# Patient Record
Sex: MALE | Race: WHITE
[De-identification: names, ages, dates, MRNs, and addresses within clinical notes are randomized per-mention and may not be internally consistent; named-entity substitution may affect disease eponyms.]

---

## 2021-07-12 ENCOUNTER — HOSPITAL ENCOUNTER (INPATIENT)
Dept: HOSPITAL 11 - JP.ED | Age: 76
LOS: 1 days | Discharge: LEFT BEFORE DISCHARGE | DRG: 923 | End: 2021-07-13
Attending: INTERNAL MEDICINE | Admitting: FAMILY MEDICINE
Payer: OTHER GOVERNMENT

## 2021-07-12 DIAGNOSIS — F17.200: ICD-10-CM

## 2021-07-12 DIAGNOSIS — F32.9: ICD-10-CM

## 2021-07-12 DIAGNOSIS — I49.9: ICD-10-CM

## 2021-07-12 DIAGNOSIS — Z88.8: ICD-10-CM

## 2021-07-12 DIAGNOSIS — R45.1: ICD-10-CM

## 2021-07-12 DIAGNOSIS — G93.40: ICD-10-CM

## 2021-07-12 DIAGNOSIS — Z90.49: ICD-10-CM

## 2021-07-12 DIAGNOSIS — Z96.649: ICD-10-CM

## 2021-07-12 DIAGNOSIS — Z88.0: ICD-10-CM

## 2021-07-12 DIAGNOSIS — T67.5XXA: ICD-10-CM

## 2021-07-12 DIAGNOSIS — F41.9: ICD-10-CM

## 2021-07-12 DIAGNOSIS — T67.01XA: Primary | ICD-10-CM

## 2021-07-12 DIAGNOSIS — Z79.899: ICD-10-CM

## 2021-07-12 DIAGNOSIS — Z91.041: ICD-10-CM

## 2021-07-12 DIAGNOSIS — M62.82: ICD-10-CM

## 2021-07-12 DIAGNOSIS — F43.10: ICD-10-CM

## 2021-07-12 DIAGNOSIS — E86.0: ICD-10-CM

## 2021-07-12 DIAGNOSIS — Z98.49: ICD-10-CM

## 2021-07-12 PROCEDURE — 85379 FIBRIN DEGRADATION QUANT: CPT

## 2021-07-12 PROCEDURE — 72125 CT NECK SPINE W/O DYE: CPT

## 2021-07-12 PROCEDURE — 80053 COMPREHEN METABOLIC PANEL: CPT

## 2021-07-12 PROCEDURE — 83690 ASSAY OF LIPASE: CPT

## 2021-07-12 PROCEDURE — 70450 CT HEAD/BRAIN W/O DYE: CPT

## 2021-07-12 PROCEDURE — 80305 DRUG TEST PRSMV DIR OPT OBS: CPT

## 2021-07-12 PROCEDURE — 87040 BLOOD CULTURE FOR BACTERIA: CPT

## 2021-07-12 PROCEDURE — 84484 ASSAY OF TROPONIN QUANT: CPT

## 2021-07-12 PROCEDURE — 93005 ELECTROCARDIOGRAM TRACING: CPT

## 2021-07-12 PROCEDURE — 82140 ASSAY OF AMMONIA: CPT

## 2021-07-12 PROCEDURE — 82550 ASSAY OF CK (CPK): CPT

## 2021-07-12 PROCEDURE — 85025 COMPLETE CBC W/AUTO DIFF WBC: CPT

## 2021-07-12 PROCEDURE — 36415 COLL VENOUS BLD VENIPUNCTURE: CPT

## 2021-07-12 PROCEDURE — 85610 PROTHROMBIN TIME: CPT

## 2021-07-12 PROCEDURE — 83605 ASSAY OF LACTIC ACID: CPT

## 2021-07-12 PROCEDURE — 81001 URINALYSIS AUTO W/SCOPE: CPT

## 2021-07-12 NOTE — EDM.PDOC
ED HPI GENERAL MEDICAL PROBLEM





- General


Chief Complaint: General


Stated Complaint: SUFFERING FROM PTSD


Time Seen by Provider: 07/12/21 17:47


Source of Information: Reports: Patient, Family (wife)





- History of Present Illness


INITIAL COMMENTS - FREE TEXT/NARRATIVE: 


Attempts to room Asad were delayed due to deep sleep while seated in a chair in 

the lobby.  Nursing staff assessed vitals signs and allowed Asad to sleep.  Wife

request patient be evaluated at time of waking in the lobby.  She was not 

comfortable taking him home or back to the [facility] without additional 

assessment.  Wife reported he has not been able to sleep for the last 2-3 night 

at the [facility] area. 





1800 Patient roomed and assessment is attempted but patient is sound asleep 

unable to awaken. Wife reports leaving [place] 1-2 days ago, Asad drove both

of them to the [facility]. Asad has been acting funny and tired while 

trying to drink adequate fluids but increase confusion and concerns about 

dehydration per wife. 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 

[employer].  Asad normally seeks care at [facility] in [place], [place]. Asad 

seemed fine yesterday after arriving in the [place] area become anxious and unable to

sleep. Report of previous skull fracture with metal plate right side of his 

head. 





Nursing staff attempted to get blood pressure which awakened patient whom seems 

to have involuntary movement of his right arm and confusion with recent memory 

loss. Asad does not recall coming to [place] ER with concerns earlier today 

or sleeping in the lobby for the last 4+hrs. Asad verbal consents to work-up 

during ER visit today but flailing arms and movements make assessment 

difficulty. Offered IM vs oral medications for anxiety and PTSD symptoms. Asad 

denies medications for PTSD or anxiety. 














- Related Data


                                    Allergies











Allergy/AdvReac Type Severity Reaction Status Date / Time


 


bupropion [From Wellbutrin] Allergy  Cannot Verified 07/12/21 18:16





   Remember  


 


Iodinated Contrast Media Allergy  Anaphylactic Verified 07/12/21 18:16





   Shock  


 


Penicillins Allergy  Cannot Verified 07/12/21 18:16





   Remember  


 


quetiapine [From Seroquel] Allergy  Other Verified 07/12/21 19:12


 


trazodone Allergy  Seizure Verified 07/12/21 19:12











Home Meds: 


                                    Home Meds





Baclofen 10 mg PO BID 07/12/21 [History]


Methylphenidate [Ritalin] 10 mg PO TID 07/12/21 [History]


traMADol [Ultram] 50 mg PO ASDIRECTED 07/12/21 [History]











ED ROS GENERAL





- Review of Systems


Review Of Systems: Unable To Obtain


Reason Not Obtained: confusion with unknown baseline. Seems to knwon wife. 





ED EXAM, GENERAL





- Physical Exam


Exam: See Below


Exam Limited By: Altered Mental Status (confusion with memory loss)


General Appearance: Alert, Moderate Distress (large non focal movements with 

right arm. seems anxious and difficult to redirect. ), Thin


Eye Exam: Bilateral Eye: EOMI, Normal Inspection





Course





- Vital Signs


Last Recorded V/S: 


                                Last Vital Signs











Temp      


 


Pulse  56 L  07/12/21 21:20


 


Resp  19   07/12/21 21:20


 


BP  118/55 L  07/12/21 21:20


 


Pulse Ox  85 L  07/12/21 21:20














- Orders/Labs/Meds


Orders: 


                               Active Orders 24 hr











 Category Date Time Status


 


 Patient Status [ADT] Routine ADT  07/12/21 22:13 Active


 


 Cardiac Monitoring [RC] .As Directed Care  07/12/21 20:09 Active


 


 Cardiac Monitoring [RC] CONTINUOUS Care  07/12/21 22:24 Active


 


 EKG Documentation Completion [RC] ASDIRECTED Care  07/12/21 22:30 Active


 


 Notify Provider Vital Signs [RC] ASDIRECTED Care  07/12/21 22:24 Active


 


 Oxygen Therapy [RC] PRN Care  07/12/21 22:13 Active


 


 Peripheral IV Care [RC] .AS DIRECTED Care  07/12/21 18:14 Active


 


 Pulse Oximetry [RC] CONTINUOUS Care  07/12/21 22:24 Active


 


 RT Aerosol Therapy [RC] ASDIRECTED Care  07/12/21 22:28 Active


 


 Up With Assistance [RC] ASDIRECTED Care  07/12/21 22:20 Active


 


 VTE/DVT Education [RC] Per Unit Routine Care  07/12/21 22:13 Active


 


 Vital Signs [RC] Q4H Care  07/12/21 22:13 Active


 


 Heart Healthy Diet [DIET] Diet  07/12/21 Breakfast Active


 


 CBC WITH AUTO DIFF [HEME] AM Lab  07/13/21 05:11 Ordered


 


 COMPREHENSIVE METABOLIC PN,CMP [CHEM] AM Lab  07/13/21 05:11 Ordered


 


 CREATINE KINASE,CK [CHEM] AM Lab  07/13/21 05:11 Ordered


 


 CULTURE BLOOD [BC] Urgent Lab  07/12/21 19:30 Received


 


 CULTURE BLOOD [BC] Urgent Lab  07/12/21 19:42 Received


 


 Acetaminophen [TylenoL] Med  07/12/21 22:25 Active





 650 mg PO Q4H PRN   


 


 Albuterol/Ipratropium [DuoNeb 3.0-0.5 MG/3 ML] Med  07/12/21 22:25 Ordered





 3 ml NEB QID PRN   


 


 Morphine Med  07/12/21 22:25 Ordered





 2 mg IVPUSH Q2H PRN   


 


 Ondansetron [Zofran ODT] Med  07/12/21 22:25 Ordered





 4 mg PO Q6H PRN   


 


 Promethazine [Phenergan] 12.5 mg Med  07/12/21 22:25 Ordered





 Sodium Chloride 0.9% [Normal Saline] 50 ml   





 IV Q6H   


 


 Sodium Chloride 0.9% [Normal Saline] 1,000 ml Med  07/12/21 20:00 Active





 IV ASDIRECTED   


 


 Sodium Chloride 0.9% [Normal Saline] 1,000 ml Med  07/12/21 22:30 Ordered





 IV ASDIRECTED   


 


 Sodium Chloride 0.9% [Saline Flush] Med  07/12/21 18:14 Active





 10 ml FLUSH ASDIRECTED PRN   


 


 oxyCODONE Med  07/12/21 22:25 Ordered





 5 mg PO Q4H PRN   


 


 Blood Culture x2 Reflex Set [OM.PC] Urgent Oth  07/12/21 18:24 Ordered


 


 Peripheral IV Insertion Adult [OM.PC] Urgent Oth  07/12/21 18:14 Ordered


 


 Resuscitation Status Routine Resus Stat  07/12/21 22:13 Ordered


 


 EKG 12 Lead [EK] Routine Ther  07/12/21 22:29 Ordered








                                Medication Orders





Acetaminophen (Acetaminophen 325 Mg Tab)  650 mg PO Q4H PRN


   PRN Reason: Pain (Mild 1-3)/fever


Albuterol/Ipratropium (Albuterol/Ipratropium 3.0-0.5 Mg/3 Ml Neb Soln)  3 ml NEB

 QID PRN


   PRN Reason: Shortness Of Breath/wheezing


Sodium Chloride (Normal Saline)  1,000 mls @ 500 mls/hr IV ASDIRECTED ENA


   Last Admin: 07/12/21 21:17  Dose: 500 mls/hr


   Documented by: REBECCAMEL


Sodium Chloride (Normal Saline)  1,000 mls @ 250 mls/hr IV ASDIRECTED ENA


Promethazine HCl 12.5 mg/ (Sodium Chloride)  50.5 mls @ 200 mls/hr IV Q6H PRN


   PRN Reason: Nausea/Vomiting


Morphine Sulfate (Morphine 2 Mg/Ml Syringe)  2 mg IVPUSH Q2H PRN


   PRN Reason: Pain (severe 7-10)


Ondansetron HCl (Ondansetron 4 Mg Tab.Dis)  4 mg PO Q6H PRN


   PRN Reason: Nausea able to take PO


Oxycodone HCl (Oxycodone 5 Mg Tab)  5 mg PO Q4H PRN


   PRN Reason: Pain (moderate 4-6)


Sodium Chloride (Sodium Chloride 0.9% 10 Ml Syringe)  10 ml FLUSH ASDIRECTED PRN


   PRN Reason: Keep Vein Open


   Last Admin: 07/12/21 19:41  Dose: 10 ml


   Documented by: NINA








Labs: 


                                Laboratory Tests











  07/12/21 07/12/21 07/12/21 Range/Units





  19:21 19:21 19:30 


 


WBC    9.3  (4.5-11.0)  K/uL


 


RBC    4.54  (4.30-5.90)  M/uL


 


Hgb    13.2  (12.0-15.0)  g/dL


 


Hct    38.8 L  (40.0-54.0)  %


 


MCV    86  (80-98)  fL


 


MCH    29  (27-31)  pg


 


MCHC    34  (32-36)  %


 


Plt Count    184  (150-400)  K/uL


 


Neut % (Auto)    71.3 H  (36-66)  %


 


Lymph % (Auto)    18.4 L  (24-44)  %


 


Mono % (Auto)    9.7 H  (2-6)  %


 


Eos % (Auto)    0.5 L  (2-4)  %


 


Baso % (Auto)    0.1  (0-1)  %


 


PT     (9.5-12.0)  sec


 


INR     (0.80-1.20)  


 


D-Dimer, Quantitative     (0.0-500.0)  ng/mL


 


Sodium     (140-148)  mmol/L


 


Potassium     (3.6-5.2)  mmol/L


 


Chloride     (100-108)  mmol/L


 


Carbon Dioxide     (21-32)  mmol/L


 


Anion Gap     (5.0-14.0)  mmol/L


 


BUN     (7-18)  mg/dL


 


Creatinine     (0.8-1.3)  mg/dL


 


Est Cr Clr Drug Dosing     mL/min


 


Estimated GFR (MDRD)     (>60)  


 


Glucose     ()  mg/dL


 


Lactic Acid     (0.4-2.0)  mmol/L


 


Calcium     (8.5-10.1)  mg/dL


 


Total Bilirubin     (0.2-1.0)  mg/dL


 


AST     (15-37)  U/L


 


ALT     (12-78)  U/L


 


Alkaline Phosphatase     ()  U/L


 


Ammonia     (11-32)  umol/L


 


Creatine Kinase     ()  U/L


 


Troponin I     (0.000-0.056)  ng/mL


 


Total Protein     (6.4-8.2)  g/dL


 


Albumin     (3.4-5.0)  g/dL


 


Globulin     (2.3-3.5)  g/dL


 


Albumin/Globulin Ratio     (1.2-2.2)  


 


Lipase     ()  U/L


 


Urine Color  Yellow    (YELLOW)  


 


Urine Appearance  Clear    (CLEAR)  


 


Urine pH  5.5    (5.0-8.0)  


 


Ur Specific Gravity  >= 1.030    (1.008-1.030)  


 


Urine Protein  30 H    (NEGATIVE)  mg/dL


 


Urine Glucose (UA)  Negative    (NEGATIVE)  mg/dL


 


Urine Ketones  15 H    (NEGATIVE)  mg/dL


 


Urine Occult Blood  Small H    (NEGATIVE)  


 


Urine Nitrite  Negative    (NEGATIVE)  


 


Urine Bilirubin  Small H    (NEGATIVE)  


 


Urine Urobilinogen  0.2    (0.2-1.0)  EU/dL


 


Ur Leukocyte Esterase  Negative    (NEGATIVE)  


 


Urine RBC  0-5    (0-5)  


 


Urine WBC  0-5    (0-5)  


 


Ur Epithelial Cells  Rare    


 


Amorphous Sediment  Not seen    


 


Urine Bacteria  Rare    


 


Urine Mucus  Not seen    


 


Urine Opiates Screen   Negative   (NEGATIVE)  


 


Ur Oxycodone Screen   Negative   (NEGATIVE)  


 


Urine Methadone Screen   Negative   (NEGATIVE)  


 


Ur Propoxyphene Screen   Negative   (NEGATIVE)  


 


Ur Barbiturates Screen   Negative   (NEGATIVE)  


 


Ur Tricyclics Screen   Negative   (NEGATIVE)  


 


Ur Phencyclidine Scrn   Negative   (NEGATIVE)  


 


Ur Amphetamine Screen   Negative   (NEGATIVE)  


 


U Methamphetamines Scrn   Negative   (NEGATIVE)  


 


Urine MDMA Screen   Negative   (NEGATIVE)  


 


U Benzodiazepines Scrn   Negative   (NEGATIVE)  


 


U Cocaine Metab Screen   Negative   (NEGATIVE)  


 


U Marijuana (THC) Screen   Negative   (NEGATIVE)  














  07/12/21 07/12/21 07/12/21 Range/Units





  19:30 19:30 19:30 


 


WBC     (4.5-11.0)  K/uL


 


RBC     (4.30-5.90)  M/uL


 


Hgb     (12.0-15.0)  g/dL


 


Hct     (40.0-54.0)  %


 


MCV     (80-98)  fL


 


MCH     (27-31)  pg


 


MCHC     (32-36)  %


 


Plt Count     (150-400)  K/uL


 


Neut % (Auto)     (36-66)  %


 


Lymph % (Auto)     (24-44)  %


 


Mono % (Auto)     (2-6)  %


 


Eos % (Auto)     (2-4)  %


 


Baso % (Auto)     (0-1)  %


 


PT     (9.5-12.0)  sec


 


INR     (0.80-1.20)  


 


D-Dimer, Quantitative  864.99 H    (0.0-500.0)  ng/mL


 


Sodium   144   (140-148)  mmol/L


 


Potassium   3.7   (3.6-5.2)  mmol/L


 


Chloride   104   (100-108)  mmol/L


 


Carbon Dioxide   27   (21-32)  mmol/L


 


Anion Gap   12.6   (5.0-14.0)  mmol/L


 


BUN   34 H   (7-18)  mg/dL


 


Creatinine   1.3   (0.8-1.3)  mg/dL


 


Est Cr Clr Drug Dosing   44.10   mL/min


 


Estimated GFR (MDRD)   54 L   (>60)  


 


Glucose   92   ()  mg/dL


 


Lactic Acid     (0.4-2.0)  mmol/L


 


Calcium   9.1   (8.5-10.1)  mg/dL


 


Total Bilirubin   0.8   (0.2-1.0)  mg/dL


 


AST   64 H   (15-37)  U/L


 


ALT   43   (12-78)  U/L


 


Alkaline Phosphatase   109   ()  U/L


 


Ammonia    20  (11-32)  umol/L


 


Creatine Kinase   1824 H   ()  U/L


 


Troponin I   < 0.017   (0.000-0.056)  ng/mL


 


Total Protein   7.2   (6.4-8.2)  g/dL


 


Albumin   3.8   (3.4-5.0)  g/dL


 


Globulin   3.4   (2.3-3.5)  g/dL


 


Albumin/Globulin Ratio   1.1 L   (1.2-2.2)  


 


Lipase   33 L   ()  U/L


 


Urine Color     (YELLOW)  


 


Urine Appearance     (CLEAR)  


 


Urine pH     (5.0-8.0)  


 


Ur Specific Gravity     (1.008-1.030)  


 


Urine Protein     (NEGATIVE)  mg/dL


 


Urine Glucose (UA)     (NEGATIVE)  mg/dL


 


Urine Ketones     (NEGATIVE)  mg/dL


 


Urine Occult Blood     (NEGATIVE)  


 


Urine Nitrite     (NEGATIVE)  


 


Urine Bilirubin     (NEGATIVE)  


 


Urine Urobilinogen     (0.2-1.0)  EU/dL


 


Ur Leukocyte Esterase     (NEGATIVE)  


 


Urine RBC     (0-5)  


 


Urine WBC     (0-5)  


 


Ur Epithelial Cells     


 


Amorphous Sediment     


 


Urine Bacteria     


 


Urine Mucus     


 


Urine Opiates Screen     (NEGATIVE)  


 


Ur Oxycodone Screen     (NEGATIVE)  


 


Urine Methadone Screen     (NEGATIVE)  


 


Ur Propoxyphene Screen     (NEGATIVE)  


 


Ur Barbiturates Screen     (NEGATIVE)  


 


Ur Tricyclics Screen     (NEGATIVE)  


 


Ur Phencyclidine Scrn     (NEGATIVE)  


 


Ur Amphetamine Screen     (NEGATIVE)  


 


U Methamphetamines Scrn     (NEGATIVE)  


 


Urine MDMA Screen     (NEGATIVE)  


 


U Benzodiazepines Scrn     (NEGATIVE)  


 


U Cocaine Metab Screen     (NEGATIVE)  


 


U Marijuana (THC) Screen     (NEGATIVE)  














  07/12/21 07/12/21 Range/Units





  19:30 19:30 


 


WBC    (4.5-11.0)  K/uL


 


RBC    (4.30-5.90)  M/uL


 


Hgb    (12.0-15.0)  g/dL


 


Hct    (40.0-54.0)  %


 


MCV    (80-98)  fL


 


MCH    (27-31)  pg


 


MCHC    (32-36)  %


 


Plt Count    (150-400)  K/uL


 


Neut % (Auto)    (36-66)  %


 


Lymph % (Auto)    (24-44)  %


 


Mono % (Auto)    (2-6)  %


 


Eos % (Auto)    (2-4)  %


 


Baso % (Auto)    (0-1)  %


 


PT   10.3  (9.5-12.0)  sec


 


INR   0.94  (0.80-1.20)  


 


D-Dimer, Quantitative    (0.0-500.0)  ng/mL


 


Sodium    (140-148)  mmol/L


 


Potassium    (3.6-5.2)  mmol/L


 


Chloride    (100-108)  mmol/L


 


Carbon Dioxide    (21-32)  mmol/L


 


Anion Gap    (5.0-14.0)  mmol/L


 


BUN    (7-18)  mg/dL


 


Creatinine    (0.8-1.3)  mg/dL


 


Est Cr Clr Drug Dosing    mL/min


 


Estimated GFR (MDRD)    (>60)  


 


Glucose    ()  mg/dL


 


Lactic Acid  1.5   (0.4-2.0)  mmol/L


 


Calcium    (8.5-10.1)  mg/dL


 


Total Bilirubin    (0.2-1.0)  mg/dL


 


AST    (15-37)  U/L


 


ALT    (12-78)  U/L


 


Alkaline Phosphatase    ()  U/L


 


Ammonia    (11-32)  umol/L


 


Creatine Kinase    ()  U/L


 


Troponin I    (0.000-0.056)  ng/mL


 


Total Protein    (6.4-8.2)  g/dL


 


Albumin    (3.4-5.0)  g/dL


 


Globulin    (2.3-3.5)  g/dL


 


Albumin/Globulin Ratio    (1.2-2.2)  


 


Lipase    ()  U/L


 


Urine Color    (YELLOW)  


 


Urine Appearance    (CLEAR)  


 


Urine pH    (5.0-8.0)  


 


Ur Specific Gravity    (1.008-1.030)  


 


Urine Protein    (NEGATIVE)  mg/dL


 


Urine Glucose (UA)    (NEGATIVE)  mg/dL


 


Urine Ketones    (NEGATIVE)  mg/dL


 


Urine Occult Blood    (NEGATIVE)  


 


Urine Nitrite    (NEGATIVE)  


 


Urine Bilirubin    (NEGATIVE)  


 


Urine Urobilinogen    (0.2-1.0)  EU/dL


 


Ur Leukocyte Esterase    (NEGATIVE)  


 


Urine RBC    (0-5)  


 


Urine WBC    (0-5)  


 


Ur Epithelial Cells    


 


Amorphous Sediment    


 


Urine Bacteria    


 


Urine Mucus    


 


Urine Opiates Screen    (NEGATIVE)  


 


Ur Oxycodone Screen    (NEGATIVE)  


 


Urine Methadone Screen    (NEGATIVE)  


 


Ur Propoxyphene Screen    (NEGATIVE)  


 


Ur Barbiturates Screen    (NEGATIVE)  


 


Ur Tricyclics Screen    (NEGATIVE)  


 


Ur Phencyclidine Scrn    (NEGATIVE)  


 


Ur Amphetamine Screen    (NEGATIVE)  


 


U Methamphetamines Scrn    (NEGATIVE)  


 


Urine MDMA Screen    (NEGATIVE)  


 


U Benzodiazepines Scrn    (NEGATIVE)  


 


U Cocaine Metab Screen    (NEGATIVE)  


 


U Marijuana (THC) Screen    (NEGATIVE)  











Meds: 


Medications











Generic Name Dose Route Start Last Admin





  Trade Name Freq  PRN Reason Stop Dose Admin


 


Acetaminophen  650 mg  07/12/21 22:25 





  Acetaminophen 325 Mg Tab  PO  





  Q4H PRN  





  Pain (Mild 1-3)/fever  


 


Albuterol/Ipratropium  3 ml  07/12/21 22:25 





  Albuterol/Ipratropium 3.0-0.5 Mg/3 Ml Neb Soln  NEB  





  QID PRN  





  Shortness Of Breath/wheezing  


 


Sodium Chloride  1,000 mls @ 500 mls/hr  07/12/21 20:00  07/12/21 21:17





  Normal Saline  IV   500 mls/hr





  ASDIRECTED ENA   Administration


 


Sodium Chloride  1,000 mls @ 250 mls/hr  07/12/21 22:30 





  Normal Saline  IV  





  ASDIRECTED ENA  


 


Promethazine HCl 12.5 mg/  50.5 mls @ 200 mls/hr  07/12/21 22:25 





  Sodium Chloride  IV  





  Q6H PRN  





  Nausea/Vomiting  


 


Morphine Sulfate  2 mg  07/12/21 22:25 





  Morphine 2 Mg/Ml Syringe  IVPUSH  





  Q2H PRN  





  Pain (severe 7-10)  


 


Ondansetron HCl  4 mg  07/12/21 22:25 





  Ondansetron 4 Mg Tab.Dis  PO  





  Q6H PRN  





  Nausea able to take PO  


 


Oxycodone HCl  5 mg  07/12/21 22:25 





  Oxycodone 5 Mg Tab  PO  





  Q4H PRN  





  Pain (moderate 4-6)  


 


Sodium Chloride  10 ml  07/12/21 18:14  07/12/21 19:41





  Sodium Chloride 0.9% 10 Ml Syringe  FLUSH   10 ml





  ASDIRECTED PRN   Administration





  Keep Vein Open  














Discontinued Medications














Generic Name Dose Route Start Last Admin





  Trade Name Freq  PRN Reason Stop Dose Admin


 


Lorazepam  1 mg  07/12/21 18:16  07/12/21 19:00





  Lorazepam 1 Mg Tab  PO  07/12/21 18:17  1 mg





  ONETIME ONE   Administration


 


Olanzapine  5 mg  07/12/21 18:15  07/12/21 19:41





  Olanzapine 10 Mg Vial  IM  07/12/21 18:16  5 mg





  ONETIME ONE   Administration














- Re-Assessments/Exams


Free Text/Narrative Re-Assessment/Exam: 





07/12/21 19:47  Urine drug screen negative but dehydration noted with UA 

available. IVF ordered with CT scan head and cervical spine due to neck pain and

 confusion. 





07/12/21 21:05  Returned from CT scans without concern noted. Blood tests do not

 show any acute concerning findings to explain acute behavior changes which are 

not normal per wife. Zyrexa, Ativan and IV fluid given due to agitation limiting

 ability to preform any meaningful evaluation. Patient will be monitored and 

allowed to rest/sleep to see if symptoms clear in the future.  [facility]

 does not allow campers to return to the park after 10pm, therefore socail 

admission may be required over night. 





07/12/21 21:56   Patient continues to rest without acute behavior concerns, IV 

infusing, increase CK and signs of dehydration without side of acute kidney 

injury at this time.  CT head and Cervical spine read by myself without acute 

head bleed and no focal deficit to support concern for large vessel stroke. 

Overnight Hospitalist contacted regarding admission OBS status and hopefully 

symptoms will clear with IV fluids and time. 








Departure





- Departure


Disposition: Refer to Observation


Clinical Impression: 


 Dehydration, Acute encephalopathy, Rhabdomyolysis








- Discharge Information





Sepsis Event Note (ED)





- Focused Exam


Vital Signs: 


                                   Vital Signs











  Pulse Resp BP Pulse Ox


 


 07/12/21 21:20  56 L  19  118/55 L  85 L


 


 07/12/21 18:46  54 L  16  129/51 L  95














- My Orders


Last 24 Hours: 


My Active Orders





07/12/21 18:14


Peripheral IV Care [RC] .AS DIRECTED 


Sodium Chloride 0.9% [Saline Flush]   10 ml FLUSH ASDIRECTED PRN 


Peripheral IV Insertion Adult [OM.PC] Urgent 





07/12/21 18:24


Blood Culture x2 Reflex Set [OM.PC] Urgent 





07/12/21 19:30


CULTURE BLOOD [BC] Urgent 





07/12/21 19:42


CULTURE BLOOD [BC] Urgent 





07/12/21 20:00


Sodium Chloride 0.9% [Normal Saline] 1,000 ml IV ASDIRECTED 





07/12/21 20:09


Cardiac Monitoring [RC] .As Directed 














- Assessment/Plan


Last 24 Hours: 


My Active Orders





07/12/21 18:14


Peripheral IV Care [RC] .AS DIRECTED 


Sodium Chloride 0.9% [Saline Flush]   10 ml FLUSH ASDIRECTED PRN 


Peripheral IV Insertion Adult [OM.PC] Urgent 





07/12/21 18:24


Blood Culture x2 Reflex Set [OM.PC] Urgent 





07/12/21 19:30


CULTURE BLOOD [BC] Urgent 





07/12/21 19:42


CULTURE BLOOD [BC] Urgent 





07/12/21 20:00


Sodium Chloride 0.9% [Normal Saline] 1,000 ml IV ASDIRECTED 





07/12/21 20:09


Cardiac Monitoring [RC] .As Directed

## 2021-07-12 NOTE — CRLCT
For Patients:  As a result of the 21st Century Cures Act, medical imaging 

exams and procedure reports are released immediately into your electronic 

medical record.  You may view this report before your referring provider.  

If you have questions, please contact your health care provider.



INDICATION:



Neck pain, confusion  



TECHNIQUE:



CT cervical spine without contrast.



COMPARISON:



None 



FINDINGS:



Vertebral alignment: Alignment is normal.  



Vertebrae: There are no fractures or suspicious bony lesions.  



Discs and facet joints: There are moderate multilevel degenerative disc and 

facet changes. 



Extraspinal findings: Paraspinous soft tissues are unremarkable.  



IMPRESSION:



1. No sign of acute injury. 



2. Multilevel degenerative spondylosis.



Please note that all CT scans at this facility use dose modulation, 

iterative reconstruction, and/or weight-based dosing when appropriate to 

reduce radiation dose to as low as reasonably achievable.



Dictated by Cesia Schuster MD @ 7/12/2021 10:12:17 PM



Signed by Dr. Cesia Schuster @ Jul 12 2021 10:12PM

## 2021-07-12 NOTE — PCM.HP.2
H&P History of Present Illness





- General


Date of Service: 07/12/21


Admit Problem/Dx: 


                           Admission Diagnosis/Problem





Admission Diagnosis/Problem      Rhabdomyolysis








Source of Information: Family, Provider, RN


History Limitations: Reports: Altered Mental Status (sedated due to agitation)





- History of Present Illness


Initial Comments - Free Text/Narative: 





Patient is a 75yo male with an unclear PMH. His wife is present with him but is 

a poor historian and does not have the faculties to give a good history. 

Pertinent information that was obtained: He was too hot and dehydrated and t

hat's one reason she insisted he come to the ER, also that he seemed agitated 

and couldn't sleep. She adds that she thinks he's had an arrhythmia since he was

a child but doesn't take anything for it. He does take medication for back pain,

and she believes he had a head injury and PTSD and may have a 'metal plate in 

his head'. Additionally she was resistant to have him admitted but did defer to 

the fact that she can't take care of him if he was discharged, especially given 

his agitation and confusion. He is sedated and unable to provide any 

information. He is vitally stable


Symptom Onset Date: 07/12/21


Duration of Symptoms: Reports: Hour(s):, Getting Worse


Severity: Severe


Improves with: Reports: None


Worsens with: Reports: None


Associated Symptoms: Reports: Confusion, Diaphoresis, Loss of Appetite, Weakness





- Related Data


Allergies/Adverse Reactions: 


                                    Allergies











Allergy/AdvReac Type Severity Reaction Status Date / Time


 


bupropion [From Wellbutrin] Allergy  Cannot Verified 07/12/21 18:16





   Remember  


 


Iodinated Contrast Media Allergy  Anaphylactic Verified 07/12/21 18:16





   Shock  


 


Penicillins Allergy  Cannot Verified 07/12/21 18:16





   Remember  


 


quetiapine [From Seroquel] Allergy  Other Verified 07/12/21 19:12


 


trazodone Allergy  Seizure Verified 07/12/21 19:12











Home Medications: 


                                    Home Meds





Baclofen 10 mg PO BID 07/12/21 [History]


Methylphenidate [Ritalin] 10 mg PO TID 07/12/21 [History]


traMADol [Ultram] 50 mg PO ASDIRECTED 07/12/21 [History]











Past Medical History


HEENT History: Reports: Cataract


Cardiovascular History: Reports: Arrhythmia


Musculoskeletal History: Reports: Fracture


Neurological History: Reports: Other (See Below)


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


Psychiatric History: Reports: Depression, PTSD





- Past Surgical History


HEENT Surgical History: Reports: Cataract Surgery


GI Surgical History: Reports: Appendectomy, Hernia, Abdominal


Neurological Surgical History: Reports: Laminectomy


Musculoskeletal Surgical History: Reports: Hip Replacement





Social & Family History





- Family History


Family Medical History: Unobtainable (sedated/confused)





- Tobacco Use


Tobacco Use Status *Q: Light Tobacco User


Years of Tobacco use: 50


Packs/Tins Daily: 0.4





- Caffeine Use


Caffeine Use: Reports: Coffee





- Recreational Drug Use


Recreational Drug Use: No





H&P Review of Systems





- Review of Systems:


Review Of Systems: Unable To Obtain


Reason Not Obtained: sedated/confused, ROS per wife


General: Reports: ROS unobtainable, Fatigue


Neurological: Reports: Confusion, Dizziness, Other (agitation)





Exam





- Exam


Exam: See Below





- Vital Signs


Vital Signs: 


                                Last Vital Signs











Temp  36.6 C   07/12/21 22:39


 


Pulse  73   07/12/21 22:39


 


Resp  17   07/12/21 22:39


 


BP  119/60   07/12/21 22:39


 


Pulse Ox  96   07/12/21 22:39











Weight: 63.503 kg





- Exam


General: Sedated


HEENT: PERRLA


Neck: Supple, Trachea Midline


Lungs: Clear to Auscultation, Normal Respiratory Effort


Cardiovascular: Regular Rate, Regular Rhythm, Normal S1, Normal S2.  No: 

Systolic Murmur, Diastolic Murmur


GI/Abdominal Exam: Normal Bowel Sounds, Soft, Non-Tender, No Organomegaly, No 

Distention, No Abnormal Bruit, No Mass, Pelvis Stable


 (Male) Exam: Deferred


Rectal (Males) Exam: Deferred


Back Exam: Normal Inspection


Extremities: Normal Inspection


Skin: Warm, Dry, Intact


Neurological: Other (unable to assess)


Neuro Extensive - Motor, Sensory, Reflexes: Other (unable to assess)


Psychiatric: Other (sedated).  No: Alert





- Patient Data


Lab Results Last 24 hrs: 


                         Laboratory Results - last 24 hr











  07/12/21 07/12/21 07/12/21 Range/Units





  19:21 19:21 19:30 


 


WBC    9.3  (4.5-11.0)  K/uL


 


RBC    4.54  (4.30-5.90)  M/uL


 


Hgb    13.2  (12.0-15.0)  g/dL


 


Hct    38.8 L  (40.0-54.0)  %


 


MCV    86  (80-98)  fL


 


MCH    29  (27-31)  pg


 


MCHC    34  (32-36)  %


 


Plt Count    184  (150-400)  K/uL


 


Neut % (Auto)    71.3 H  (36-66)  %


 


Lymph % (Auto)    18.4 L  (24-44)  %


 


Mono % (Auto)    9.7 H  (2-6)  %


 


Eos % (Auto)    0.5 L  (2-4)  %


 


Baso % (Auto)    0.1  (0-1)  %


 


PT     (9.5-12.0)  sec


 


INR     (0.80-1.20)  


 


D-Dimer, Quantitative     (0.0-500.0)  ng/mL


 


Sodium     (140-148)  mmol/L


 


Potassium     (3.6-5.2)  mmol/L


 


Chloride     (100-108)  mmol/L


 


Carbon Dioxide     (21-32)  mmol/L


 


Anion Gap     (5.0-14.0)  mmol/L


 


BUN     (7-18)  mg/dL


 


Creatinine     (0.8-1.3)  mg/dL


 


Est Cr Clr Drug Dosing     mL/min


 


Estimated GFR (MDRD)     (>60)  


 


Glucose     ()  mg/dL


 


Lactic Acid     (0.4-2.0)  mmol/L


 


Calcium     (8.5-10.1)  mg/dL


 


Total Bilirubin     (0.2-1.0)  mg/dL


 


AST     (15-37)  U/L


 


ALT     (12-78)  U/L


 


Alkaline Phosphatase     ()  U/L


 


Ammonia     (11-32)  umol/L


 


Creatine Kinase     ()  U/L


 


Troponin I     (0.000-0.056)  ng/mL


 


Total Protein     (6.4-8.2)  g/dL


 


Albumin     (3.4-5.0)  g/dL


 


Globulin     (2.3-3.5)  g/dL


 


Albumin/Globulin Ratio     (1.2-2.2)  


 


Lipase     ()  U/L


 


Urine Color  Yellow    (YELLOW)  


 


Urine Appearance  Clear    (CLEAR)  


 


Urine pH  5.5    (5.0-8.0)  


 


Ur Specific Gravity  >= 1.030    (1.008-1.030)  


 


Urine Protein  30 H    (NEGATIVE)  mg/dL


 


Urine Glucose (UA)  Negative    (NEGATIVE)  mg/dL


 


Urine Ketones  15 H    (NEGATIVE)  mg/dL


 


Urine Occult Blood  Small H    (NEGATIVE)  


 


Urine Nitrite  Negative    (NEGATIVE)  


 


Urine Bilirubin  Small H    (NEGATIVE)  


 


Urine Urobilinogen  0.2    (0.2-1.0)  EU/dL


 


Ur Leukocyte Esterase  Negative    (NEGATIVE)  


 


Urine RBC  0-5    (0-5)  


 


Urine WBC  0-5    (0-5)  


 


Ur Epithelial Cells  Rare    


 


Amorphous Sediment  Not seen    


 


Urine Bacteria  Rare    


 


Urine Mucus  Not seen    


 


Urine Opiates Screen   Negative   (NEGATIVE)  


 


Ur Oxycodone Screen   Negative   (NEGATIVE)  


 


Urine Methadone Screen   Negative   (NEGATIVE)  


 


Ur Propoxyphene Screen   Negative   (NEGATIVE)  


 


Ur Barbiturates Screen   Negative   (NEGATIVE)  


 


Ur Tricyclics Screen   Negative   (NEGATIVE)  


 


Ur Phencyclidine Scrn   Negative   (NEGATIVE)  


 


Ur Amphetamine Screen   Negative   (NEGATIVE)  


 


U Methamphetamines Scrn   Negative   (NEGATIVE)  


 


Urine MDMA Screen   Negative   (NEGATIVE)  


 


U Benzodiazepines Scrn   Negative   (NEGATIVE)  


 


U Cocaine Metab Screen   Negative   (NEGATIVE)  


 


U Marijuana (THC) Screen   Negative   (NEGATIVE)  














  07/12/21 07/12/21 07/12/21 Range/Units





  19:30 19:30 19:30 


 


WBC     (4.5-11.0)  K/uL


 


RBC     (4.30-5.90)  M/uL


 


Hgb     (12.0-15.0)  g/dL


 


Hct     (40.0-54.0)  %


 


MCV     (80-98)  fL


 


MCH     (27-31)  pg


 


MCHC     (32-36)  %


 


Plt Count     (150-400)  K/uL


 


Neut % (Auto)     (36-66)  %


 


Lymph % (Auto)     (24-44)  %


 


Mono % (Auto)     (2-6)  %


 


Eos % (Auto)     (2-4)  %


 


Baso % (Auto)     (0-1)  %


 


PT     (9.5-12.0)  sec


 


INR     (0.80-1.20)  


 


D-Dimer, Quantitative  864.99 H    (0.0-500.0)  ng/mL


 


Sodium   144   (140-148)  mmol/L


 


Potassium   3.7   (3.6-5.2)  mmol/L


 


Chloride   104   (100-108)  mmol/L


 


Carbon Dioxide   27   (21-32)  mmol/L


 


Anion Gap   12.6   (5.0-14.0)  mmol/L


 


BUN   34 H   (7-18)  mg/dL


 


Creatinine   1.3   (0.8-1.3)  mg/dL


 


Est Cr Clr Drug Dosing   44.10   mL/min


 


Estimated GFR (MDRD)   54 L   (>60)  


 


Glucose   92   ()  mg/dL


 


Lactic Acid     (0.4-2.0)  mmol/L


 


Calcium   9.1   (8.5-10.1)  mg/dL


 


Total Bilirubin   0.8   (0.2-1.0)  mg/dL


 


AST   64 H   (15-37)  U/L


 


ALT   43   (12-78)  U/L


 


Alkaline Phosphatase   109   ()  U/L


 


Ammonia    20  (11-32)  umol/L


 


Creatine Kinase   1824 H   ()  U/L


 


Troponin I   < 0.017   (0.000-0.056)  ng/mL


 


Total Protein   7.2   (6.4-8.2)  g/dL


 


Albumin   3.8   (3.4-5.0)  g/dL


 


Globulin   3.4   (2.3-3.5)  g/dL


 


Albumin/Globulin Ratio   1.1 L   (1.2-2.2)  


 


Lipase   33 L   ()  U/L


 


Urine Color     (YELLOW)  


 


Urine Appearance     (CLEAR)  


 


Urine pH     (5.0-8.0)  


 


Ur Specific Gravity     (1.008-1.030)  


 


Urine Protein     (NEGATIVE)  mg/dL


 


Urine Glucose (UA)     (NEGATIVE)  mg/dL


 


Urine Ketones     (NEGATIVE)  mg/dL


 


Urine Occult Blood     (NEGATIVE)  


 


Urine Nitrite     (NEGATIVE)  


 


Urine Bilirubin     (NEGATIVE)  


 


Urine Urobilinogen     (0.2-1.0)  EU/dL


 


Ur Leukocyte Esterase     (NEGATIVE)  


 


Urine RBC     (0-5)  


 


Urine WBC     (0-5)  


 


Ur Epithelial Cells     


 


Amorphous Sediment     


 


Urine Bacteria     


 


Urine Mucus     


 


Urine Opiates Screen     (NEGATIVE)  


 


Ur Oxycodone Screen     (NEGATIVE)  


 


Urine Methadone Screen     (NEGATIVE)  


 


Ur Propoxyphene Screen     (NEGATIVE)  


 


Ur Barbiturates Screen     (NEGATIVE)  


 


Ur Tricyclics Screen     (NEGATIVE)  


 


Ur Phencyclidine Scrn     (NEGATIVE)  


 


Ur Amphetamine Screen     (NEGATIVE)  


 


U Methamphetamines Scrn     (NEGATIVE)  


 


Urine MDMA Screen     (NEGATIVE)  


 


U Benzodiazepines Scrn     (NEGATIVE)  


 


U Cocaine Metab Screen     (NEGATIVE)  


 


U Marijuana (THC) Screen     (NEGATIVE)  














  07/12/21 07/12/21 Range/Units





  19:30 19:30 


 


WBC    (4.5-11.0)  K/uL


 


RBC    (4.30-5.90)  M/uL


 


Hgb    (12.0-15.0)  g/dL


 


Hct    (40.0-54.0)  %


 


MCV    (80-98)  fL


 


MCH    (27-31)  pg


 


MCHC    (32-36)  %


 


Plt Count    (150-400)  K/uL


 


Neut % (Auto)    (36-66)  %


 


Lymph % (Auto)    (24-44)  %


 


Mono % (Auto)    (2-6)  %


 


Eos % (Auto)    (2-4)  %


 


Baso % (Auto)    (0-1)  %


 


PT   10.3  (9.5-12.0)  sec


 


INR   0.94  (0.80-1.20)  


 


D-Dimer, Quantitative    (0.0-500.0)  ng/mL


 


Sodium    (140-148)  mmol/L


 


Potassium    (3.6-5.2)  mmol/L


 


Chloride    (100-108)  mmol/L


 


Carbon Dioxide    (21-32)  mmol/L


 


Anion Gap    (5.0-14.0)  mmol/L


 


BUN    (7-18)  mg/dL


 


Creatinine    (0.8-1.3)  mg/dL


 


Est Cr Clr Drug Dosing    mL/min


 


Estimated GFR (MDRD)    (>60)  


 


Glucose    ()  mg/dL


 


Lactic Acid  1.5   (0.4-2.0)  mmol/L


 


Calcium    (8.5-10.1)  mg/dL


 


Total Bilirubin    (0.2-1.0)  mg/dL


 


AST    (15-37)  U/L


 


ALT    (12-78)  U/L


 


Alkaline Phosphatase    ()  U/L


 


Ammonia    (11-32)  umol/L


 


Creatine Kinase    ()  U/L


 


Troponin I    (0.000-0.056)  ng/mL


 


Total Protein    (6.4-8.2)  g/dL


 


Albumin    (3.4-5.0)  g/dL


 


Globulin    (2.3-3.5)  g/dL


 


Albumin/Globulin Ratio    (1.2-2.2)  


 


Lipase    ()  U/L


 


Urine Color    (YELLOW)  


 


Urine Appearance    (CLEAR)  


 


Urine pH    (5.0-8.0)  


 


Ur Specific Gravity    (1.008-1.030)  


 


Urine Protein    (NEGATIVE)  mg/dL


 


Urine Glucose (UA)    (NEGATIVE)  mg/dL


 


Urine Ketones    (NEGATIVE)  mg/dL


 


Urine Occult Blood    (NEGATIVE)  


 


Urine Nitrite    (NEGATIVE)  


 


Urine Bilirubin    (NEGATIVE)  


 


Urine Urobilinogen    (0.2-1.0)  EU/dL


 


Ur Leukocyte Esterase    (NEGATIVE)  


 


Urine RBC    (0-5)  


 


Urine WBC    (0-5)  


 


Ur Epithelial Cells    


 


Amorphous Sediment    


 


Urine Bacteria    


 


Urine Mucus    


 


Urine Opiates Screen    (NEGATIVE)  


 


Ur Oxycodone Screen    (NEGATIVE)  


 


Urine Methadone Screen    (NEGATIVE)  


 


Ur Propoxyphene Screen    (NEGATIVE)  


 


Ur Barbiturates Screen    (NEGATIVE)  


 


Ur Tricyclics Screen    (NEGATIVE)  


 


Ur Phencyclidine Scrn    (NEGATIVE)  


 


Ur Amphetamine Screen    (NEGATIVE)  


 


U Methamphetamines Scrn    (NEGATIVE)  


 


Urine MDMA Screen    (NEGATIVE)  


 


U Benzodiazepines Scrn    (NEGATIVE)  


 


U Cocaine Metab Screen    (NEGATIVE)  


 


U Marijuana (THC) Screen    (NEGATIVE)  











Result Diagrams: 


                                 07/12/21 19:30





                                 07/12/21 19:30


[place] Results Last 24 hrs: 


                         Laboratory Results - last 24 hr











  07/12/21 07/12/21 07/12/21 Range/Units





  19:21 19:21 19:30 


 


WBC    9.3  (4.5-11.0)  K/uL


 


RBC    4.54  (4.30-5.90)  M/uL


 


Hgb    13.2  (12.0-15.0)  g/dL


 


Hct    38.8 L  (40.0-54.0)  %


 


MCV    86  (80-98)  fL


 


MCH    29  (27-31)  pg


 


MCHC    34  (32-36)  %


 


Plt Count    184  (150-400)  K/uL


 


Neut % (Auto)    71.3 H  (36-66)  %


 


Lymph % (Auto)    18.4 L  (24-44)  %


 


Mono % (Auto)    9.7 H  (2-6)  %


 


Eos % (Auto)    0.5 L  (2-4)  %


 


Baso % (Auto)    0.1  (0-1)  %


 


PT     (9.5-12.0)  sec


 


INR     (0.80-1.20)  


 


D-Dimer, Quantitative     (0.0-500.0)  ng/mL


 


Sodium     (140-148)  mmol/L


 


Potassium     (3.6-5.2)  mmol/L


 


Chloride     (100-108)  mmol/L


 


Carbon Dioxide     (21-32)  mmol/L


 


Anion Gap     (5.0-14.0)  mmol/L


 


BUN     (7-18)  mg/dL


 


Creatinine     (0.8-1.3)  mg/dL


 


Est Cr Clr Drug Dosing     mL/min


 


Estimated GFR (MDRD)     (>60)  


 


Glucose     ()  mg/dL


 


Lactic Acid     (0.4-2.0)  mmol/L


 


Calcium     (8.5-10.1)  mg/dL


 


Total Bilirubin     (0.2-1.0)  mg/dL


 


AST     (15-37)  U/L


 


ALT     (12-78)  U/L


 


Alkaline Phosphatase     ()  U/L


 


Ammonia     (11-32)  umol/L


 


Creatine Kinase     ()  U/L


 


Troponin I     (0.000-0.056)  ng/mL


 


Total Protein     (6.4-8.2)  g/dL


 


Albumin     (3.4-5.0)  g/dL


 


Globulin     (2.3-3.5)  g/dL


 


Albumin/Globulin Ratio     (1.2-2.2)  


 


Lipase     ()  U/L


 


Urine Color  Yellow    (YELLOW)  


 


Urine Appearance  Clear    (CLEAR)  


 


Urine pH  5.5    (5.0-8.0)  


 


Ur Specific Gravity  >= 1.030    (1.008-1.030)  


 


Urine Protein  30 H    (NEGATIVE)  mg/dL


 


Urine Glucose (UA)  Negative    (NEGATIVE)  mg/dL


 


Urine Ketones  15 H    (NEGATIVE)  mg/dL


 


Urine Occult Blood  Small H    (NEGATIVE)  


 


Urine Nitrite  Negative    (NEGATIVE)  


 


Urine Bilirubin  Small H    (NEGATIVE)  


 


Urine Urobilinogen  0.2    (0.2-1.0)  EU/dL


 


Ur Leukocyte Esterase  Negative    (NEGATIVE)  


 


Urine RBC  0-5    (0-5)  


 


Urine WBC  0-5    (0-5)  


 


Ur Epithelial Cells  Rare    


 


Amorphous Sediment  Not seen    


 


Urine Bacteria  Rare    


 


Urine Mucus  Not seen    


 


Urine Opiates Screen   Negative   (NEGATIVE)  


 


Ur Oxycodone Screen   Negative   (NEGATIVE)  


 


Urine Methadone Screen   Negative   (NEGATIVE)  


 


Ur Propoxyphene Screen   Negative   (NEGATIVE)  


 


Ur Barbiturates Screen   Negative   (NEGATIVE)  


 


Ur Tricyclics Screen   Negative   (NEGATIVE)  


 


Ur Phencyclidine Scrn   Negative   (NEGATIVE)  


 


Ur Amphetamine Screen   Negative   (NEGATIVE)  


 


U Methamphetamines Scrn   Negative   (NEGATIVE)  


 


Urine MDMA Screen   Negative   (NEGATIVE)  


 


U Benzodiazepines Scrn   Negative   (NEGATIVE)  


 


U Cocaine Metab Screen   Negative   (NEGATIVE)  


 


U Marijuana (THC) Screen   Negative   (NEGATIVE)  














  07/12/21 07/12/21 07/12/21 Range/Units





  19:30 19:30 19:30 


 


WBC     (4.5-11.0)  K/uL


 


RBC     (4.30-5.90)  M/uL


 


Hgb     (12.0-15.0)  g/dL


 


Hct     (40.0-54.0)  %


 


MCV     (80-98)  fL


 


MCH     (27-31)  pg


 


MCHC     (32-36)  %


 


Plt Count     (150-400)  K/uL


 


Neut % (Auto)     (36-66)  %


 


Lymph % (Auto)     (24-44)  %


 


Mono % (Auto)     (2-6)  %


 


Eos % (Auto)     (2-4)  %


 


Baso % (Auto)     (0-1)  %


 


PT     (9.5-12.0)  sec


 


INR     (0.80-1.20)  


 


D-Dimer, Quantitative  864.99 H    (0.0-500.0)  ng/mL


 


Sodium   144   (140-148)  mmol/L


 


Potassium   3.7   (3.6-5.2)  mmol/L


 


Chloride   104   (100-108)  mmol/L


 


Carbon Dioxide   27   (21-32)  mmol/L


 


Anion Gap   12.6   (5.0-14.0)  mmol/L


 


BUN   34 H   (7-18)  mg/dL


 


Creatinine   1.3   (0.8-1.3)  mg/dL


 


Est Cr Clr Drug Dosing   44.10   mL/min


 


Estimated GFR (MDRD)   54 L   (>60)  


 


Glucose   92   ()  mg/dL


 


Lactic Acid     (0.4-2.0)  mmol/L


 


Calcium   9.1   (8.5-10.1)  mg/dL


 


Total Bilirubin   0.8   (0.2-1.0)  mg/dL


 


AST   64 H   (15-37)  U/L


 


ALT   43   (12-78)  U/L


 


Alkaline Phosphatase   109   ()  U/L


 


Ammonia    20  (11-32)  umol/L


 


Creatine Kinase   1824 H   ()  U/L


 


Troponin I   < 0.017   (0.000-0.056)  ng/mL


 


Total Protein   7.2   (6.4-8.2)  g/dL


 


Albumin   3.8   (3.4-5.0)  g/dL


 


Globulin   3.4   (2.3-3.5)  g/dL


 


Albumin/Globulin Ratio   1.1 L   (1.2-2.2)  


 


Lipase   33 L   ()  U/L


 


Urine Color     (YELLOW)  


 


Urine Appearance     (CLEAR)  


 


Urine pH     (5.0-8.0)  


 


Ur Specific Gravity     (1.008-1.030)  


 


Urine Protein     (NEGATIVE)  mg/dL


 


Urine Glucose (UA)     (NEGATIVE)  mg/dL


 


Urine Ketones     (NEGATIVE)  mg/dL


 


Urine Occult Blood     (NEGATIVE)  


 


Urine Nitrite     (NEGATIVE)  


 


Urine Bilirubin     (NEGATIVE)  


 


Urine Urobilinogen     (0.2-1.0)  EU/dL


 


Ur Leukocyte Esterase     (NEGATIVE)  


 


Urine RBC     (0-5)  


 


Urine WBC     (0-5)  


 


Ur Epithelial Cells     


 


Amorphous Sediment     


 


Urine Bacteria     


 


Urine Mucus     


 


Urine Opiates Screen     (NEGATIVE)  


 


Ur Oxycodone Screen     (NEGATIVE)  


 


Urine Methadone Screen     (NEGATIVE)  


 


Ur Propoxyphene Screen     (NEGATIVE)  


 


Ur Barbiturates Screen     (NEGATIVE)  


 


Ur Tricyclics Screen     (NEGATIVE)  


 


Ur Phencyclidine Scrn     (NEGATIVE)  


 


Ur Amphetamine Screen     (NEGATIVE)  


 


U Methamphetamines Scrn     (NEGATIVE)  


 


Urine MDMA Screen     (NEGATIVE)  


 


U Benzodiazepines Scrn     (NEGATIVE)  


 


U Cocaine Metab Screen     (NEGATIVE)  


 


U Marijuana (THC) Screen     (NEGATIVE)  














  07/12/21 07/12/21 Range/Units





  19:30 19:30 


 


WBC    (4.5-11.0)  K/uL


 


RBC    (4.30-5.90)  M/uL


 


Hgb    (12.0-15.0)  g/dL


 


Hct    (40.0-54.0)  %


 


MCV    (80-98)  fL


 


MCH    (27-31)  pg


 


MCHC    (32-36)  %


 


Plt Count    (150-400)  K/uL


 


Neut % (Auto)    (36-66)  %


 


Lymph % (Auto)    (24-44)  %


 


Mono % (Auto)    (2-6)  %


 


Eos % (Auto)    (2-4)  %


 


Baso % (Auto)    (0-1)  %


 


PT   10.3  (9.5-12.0)  sec


 


INR   0.94  (0.80-1.20)  


 


D-Dimer, Quantitative    (0.0-500.0)  ng/mL


 


Sodium    (140-148)  mmol/L


 


Potassium    (3.6-5.2)  mmol/L


 


Chloride    (100-108)  mmol/L


 


Carbon Dioxide    (21-32)  mmol/L


 


Anion Gap    (5.0-14.0)  mmol/L


 


BUN    (7-18)  mg/dL


 


Creatinine    (0.8-1.3)  mg/dL


 


Est Cr Clr Drug Dosing    mL/min


 


Estimated GFR (MDRD)    (>60)  


 


Glucose    ()  mg/dL


 


Lactic Acid  1.5   (0.4-2.0)  mmol/L


 


Calcium    (8.5-10.1)  mg/dL


 


Total Bilirubin    (0.2-1.0)  mg/dL


 


AST    (15-37)  U/L


 


ALT    (12-78)  U/L


 


Alkaline Phosphatase    ()  U/L


 


Ammonia    (11-32)  umol/L


 


Creatine Kinase    ()  U/L


 


Troponin I    (0.000-0.056)  ng/mL


 


Total Protein    (6.4-8.2)  g/dL


 


Albumin    (3.4-5.0)  g/dL


 


Globulin    (2.3-3.5)  g/dL


 


Albumin/Globulin Ratio    (1.2-2.2)  


 


Lipase    ()  U/L


 


Urine Color    (YELLOW)  


 


Urine Appearance    (CLEAR)  


 


Urine pH    (5.0-8.0)  


 


Ur Specific Gravity    (1.008-1.030)  


 


Urine Protein    (NEGATIVE)  mg/dL


 


Urine Glucose (UA)    (NEGATIVE)  mg/dL


 


Urine Ketones    (NEGATIVE)  mg/dL


 


Urine Occult Blood    (NEGATIVE)  


 


Urine Nitrite    (NEGATIVE)  


 


Urine Bilirubin    (NEGATIVE)  


 


Urine Urobilinogen    (0.2-1.0)  EU/dL


 


Ur Leukocyte Esterase    (NEGATIVE)  


 


Urine RBC    (0-5)  


 


Urine WBC    (0-5)  


 


Ur Epithelial Cells    


 


Amorphous Sediment    


 


Urine Bacteria    


 


Urine Mucus    


 


Urine Opiates Screen    (NEGATIVE)  


 


Ur Oxycodone Screen    (NEGATIVE)  


 


Urine Methadone Screen    (NEGATIVE)  


 


Ur Propoxyphene Screen    (NEGATIVE)  


 


Ur Barbiturates Screen    (NEGATIVE)  


 


Ur Tricyclics Screen    (NEGATIVE)  


 


Ur Phencyclidine Scrn    (NEGATIVE)  


 


Ur Amphetamine Screen    (NEGATIVE)  


 


U Methamphetamines Scrn    (NEGATIVE)  


 


Urine MDMA Screen    (NEGATIVE)  


 


U Benzodiazepines Scrn    (NEGATIVE)  


 


U Cocaine Metab Screen    (NEGATIVE)  


 


U Marijuana (THC) Screen    (NEGATIVE)  











  ** #1 Interpretation


EKG Date: 07/12/21


Rhythm: Other (sinus arrythmia)


Axis: Normal


P-Wave: Present


QRS: Normal


ST-T: Normal


QT: Prolonged (461)


EKG Interpretation Comments: 





has sinus arrhythmia with what appears to be PACs and PVCs as well as a multif

ocal atrial complex. Patient has known arrhythmia





Sepsis Event Note





- Evaluation


Sepsis Screening Result: No Definite Risk





- Focused Exam


Vital Signs: 


                                   Vital Signs











  Temp Pulse Resp BP Pulse Ox


 


 07/12/21 22:39  36.6 C  73  17  119/60  96


 


 07/12/21 21:20   56 L  19  118/55 L  85 L


 


 07/12/21 18:46   54 L  16  129/51 L  95














- Problem List


(1) Rhabdomyolysis


SNOMED Code(s): 504785218


   ICD Code: M62.82 - RHABDOMYOLYSIS   Status: Acute   Current Visit: Yes   

Onset Date: ~07/12/21   Problem Details: Likely 2/2 to possible heat stroke, but

 certainly heat exhaustion. Patient will be given fluids 250cc/hr to maintain 

urinary output of 250cc/hr after boluses. Patient is sedated due to agitiation 

likely 2/2 to dehydration    


Qualifiers: 


   Rhabdomyolysis type: non-traumatic   Qualified Code(s): M62.82 - 

Rhabdomyolysis   





(2) Heat exhaustion


SNOMED Code(s): 66346061


   ICD Code: T67.5XXA - HEAT EXHAUSTION, UNSPECIFIED, INITIAL ENCOUNTER   

Status: Acute   Current Visit: Yes   Onset Date: 07/12/21   Problem Details: 

patient's wife says he was getting very hot and felt overheated, even though she

 thinks he may still have been sweating because he was very wet. She says he w

asn't acting right and she made him come to the ER. By the time he arrived in 

the ER his temp was not elevated, so it is difficult to say if he had true heat 

stroke, but with the dehydration and rhabdo, it is likely he did. Will treat 

with aggressive fluids as rhabdo is a severe complicating feature. Will treat 

agitation with ativan and haldol due to patient's arrhythmia    


Qualifiers: 


   Encounter type: initial encounter   Qualified Code(s): T67.5XXA - Heat 

exhaustion, unspecified, initial encounter   





(3) Arrhythmia


SNOMED Code(s): 612992174


   ICD Code: I49.9 - CARDIAC ARRHYTHMIA, UNSPECIFIED   Status: Acute   Current 

Visit: Yes   Onset Date: Unknown   Problem Details: known to patient, wife 

doesn't believe he takes medication for this, will continue cardiac monitoring  

 


Qualifiers: 


   Arrhythmia type: unspecified cardiac arrhythmia   Qualified Code(s): I49.9 - 

Cardiac arrhythmia, unspecified   





(4) Acute encephalopathy


SNOMED Code(s): 22976955, 097762901


   ICD Code: G93.40 - ENCEPHALOPATHY, UNSPECIFIED   Status: Acute   Current 

Visit: Yes   Onset Date: ~07/12/21   Problem Details: 2/2 to heat 

exhaustion/stroke. Will treat with fluids and ativan and haldol as needed.    





(5) Dehydration


SNOMED Code(s): 70015218


   ICD Code: E86.0 - DEHYDRATION   Status: Acute   Priority: High   Current 

Visit: Yes   Onset Date: ~07/12/21   Problem Details: likely precipitating event

 to rhabdo and heat exhaustion/stroke. Will give aggressive fluids due to 

rhabdo.    





(6) Agitation


SNOMED Code(s): 118754974


   ICD Code: R45.1 - RESTLESSNESS AND AGITATION   Status: Acute   Current Visit:

 Yes   Problem Details: will use ativan and haldol to treat. Can give up to 5mg 

haldol per 8 hours if needed for agitation.   


Problem List Initiated/Reviewed/Updated: Yes


Orders Last 24hrs: 


                               Active Orders 24 hr











 Category Date Time Status


 


 Patient Status Manage Transfer [TRANSFER] Routine ADT  07/12/21 22:39 Active


 


 Patient Status [ADT] Routine ADT  07/12/21 22:13 Active


 


 Cardiac Monitoring [RC] .As Directed Care  07/12/21 20:09 Active


 


 Cardiac Monitoring [RC] CONTINUOUS Care  07/12/21 22:24 Active


 


 EKG Documentation Completion [RC] ASDIRECTED Care  07/12/21 22:30 Active


 


 Intake and Output [RC] PER UNIT ROUTINE Care  07/12/21 22:38 Active


 


 Notify Provider Vital Signs [RC] ASDIRECTED Care  07/12/21 22:24 Active


 


 Oxygen Therapy [RC] PRN Care  07/12/21 22:13 Active


 


 Peripheral IV Care [RC] .AS DIRECTED Care  07/12/21 18:14 Active


 


 Pulse Oximetry [RC] CONTINUOUS Care  07/12/21 22:24 Active


 


 RT Aerosol Therapy [RC] ASDIRECTED Care  07/12/21 22:28 Active


 


 Up With Assistance [RC] ASDIRECTED Care  07/12/21 22:20 Active


 


 Urinary Catheter Assessment [RC] ASDIRECTED Care  07/12/21 22:37 Active


 


 Urinary Catheter Insertion [Insert Urinary Catheter] [ Care  07/12/21 22:45 

Ordered





 OM.PC] Q24H   


 


 VTE/DVT Education [RC] Per Unit Routine Care  07/12/21 22:13 Active


 


 Vital Signs [RC] Q4H Care  07/12/21 22:13 Active


 


 Heart Healthy Diet [DIET] Diet  07/12/21 Breakfast Active


 


 CBC WITH AUTO DIFF [HEME] AM Lab  07/13/21 05:11 Ordered


 


 COMPREHENSIVE METABOLIC PN,CMP [CHEM] AM Lab  07/13/21 05:11 Ordered


 


 CREATINE KINASE,CK [CHEM] AM Lab  07/13/21 05:11 Ordered


 


 CULTURE BLOOD [BC] Urgent Lab  07/12/21 19:30 Received


 


 CULTURE BLOOD [BC] Urgent Lab  07/12/21 19:42 Received


 


 Acetaminophen [TylenoL] Med  07/12/21 22:25 Active





 650 mg PO Q4H PRN   


 


 Albuterol/Ipratropium [DuoNeb 3.0-0.5 MG/3 ML] Med  07/12/21 22:25 Active





 3 ml NEB QID PRN   


 


 Haloperidol Lactate [Haldol] Med  07/12/21 23:08 Ordered





 2 mg IVPUSH Q8H PRN   


 


 LORazepam [Ativan] Med  07/12/21 22:53 Ordered





 1 mg IVPUSH Q4H PRN   


 


 Morphine Med  07/12/21 22:25 Active





 2 mg IVPUSH Q2H PRN   


 


 Ondansetron [Zofran ODT] Med  07/12/21 22:25 Stop Req





 4 mg PO Q6H PRN   


 


 Promethazine [Phenergan] 12.5 mg Med  07/12/21 22:25 Active





 Sodium Chloride 0.9% [Normal Saline] 50 ml   





 IV Q6H   


 


 Sodium Chloride 0.9% [Normal Saline] 1,000 ml Med  07/12/21 20:00 Active





 IV ASDIRECTED   


 


 Sodium Chloride 0.9% [Normal Saline] 1,000 ml Med  07/12/21 22:30 Active





 IV ASDIRECTED   


 


 Sodium Chloride 0.9% [Saline Flush] Med  07/12/21 18:14 Active





 10 ml FLUSH ASDIRECTED PRN   


 


 oxyCODONE Med  07/12/21 22:25 Active





 5 mg PO Q4H PRN   


 


 Blood Culture x2 Reflex Set [OM.PC] Urgent Oth  07/12/21 18:24 Ordered


 


 Peripheral IV Insertion Adult [OM.PC] Urgent Oth  07/12/21 18:14 Ordered


 


 Resuscitation Status Routine Resus Stat  07/12/21 22:13 Ordered


 


 EKG 12 Lead [EK] Routine Ther  07/12/21 22:29 Ordered








                                Medication Orders





Acetaminophen (Acetaminophen 325 Mg Tab)  650 mg PO Q4H PRN


   PRN Reason: Pain (Mild 1-3)/fever


Albuterol/Ipratropium (Albuterol/Ipratropium 3.0-0.5 Mg/3 Ml Neb Soln)  3 ml NEB

 QID PRN


   PRN Reason: Shortness Of Breath/wheezing


Haloperidol Lactate (Haloperidol Lactate 5 Mg/Ml Sdv)  2 mg IVPUSH Q8H PRN


   PRN Reason: Agitation


Sodium Chloride (Normal Saline)  1,000 mls @ 500 mls/hr IV ASDIRECTED ENA


   Last Admin: 07/12/21 21:17  Dose: 500 mls/hr


   Documented by: NINA


Sodium Chloride (Normal Saline)  1,000 mls @ 250 mls/hr IV ASDIRECTED [facility]


Promethazine HCl 12.5 mg/ (Sodium Chloride)  50.5 mls @ 200 mls/hr IV Q6H PRN


   PRN Reason: Nausea/Vomiting


Lorazepam (Lorazepam 2 Mg/Ml Sdv)  1 mg IVPUSH Q4H PRN


   PRN Reason: Agitation


Morphine Sulfate (Morphine 2 Mg/Ml Syringe)  2 mg IVPUSH Q2H PRN


   PRN Reason: Pain (severe 7-10)


Ondansetron HCl (Ondansetron 4 Mg Tab.Dis)  4 mg PO Q6H PRN


   PRN Reason: Nausea able to take PO


Oxycodone HCl (Oxycodone 5 Mg Tab)  5 mg PO Q4H PRN


   PRN Reason: Pain (moderate 4-6)


Sodium Chloride (Sodium Chloride 0.9% 10 Ml Syringe)  10 ml FLUSH ASDIRECTED PRN


   PRN Reason: Keep Vein Open


   Last Admin: 07/12/21 19:41  Dose: 10 ml


   Documented by: NINA








Assessment/Plan Comment:: 





Patient will likely improve with fluids, but condition is such that patient's 

status may require transfer or change to ICU status. 





- Mortality Measure


Prognosis:: Good

## 2021-07-12 NOTE — CRLCT
For Patients:  As a result of the 21st Century Cures Act, medical imaging 

exams and procedure reports are released immediately into your electronic 

medical record.  You may view this report before your referring provider.  

If you have questions, please contact your health care provider.



CT HEAD



DATE: 7/12/2021



CLINICAL HISTORY: Patient with confusion. 



TECHNIQUE: Standard CT scanning of the head was performed.



COMPARISON: None.



FINDINGS: 



There is a left fronto-temporal craniotomy with underlying dural 

thickening. 



There is no definite intracranial hemorrhage.



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



There are moderate microangiopathic changes.



There is diffuse parenchymal volume loss. 



There is no mass effect or midline shift.



The orbits are unremarkable.



The paranasal sinuses are unremarkable.



The mastoid air cells are unremarkable.



IMPRESSION:



1. No definite intracranial hemorrhage.



2. Left fronto-temporal craniotomy with underlying dural thickening. 



3. 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 

left putamen.



4. Moderate microangiopathic changes and diffuse parenchymal volume loss. 



Please note that all CT scans at this facility use dose modulation, 

iterative reconstruction, and/or weight-based dosing when appropriate to 

reduce radiation dose to as low as reasonably achievable.



Dictated by: Nanette Rabago MD @ 07/12/2021 22:08:23



(Electronically Signed)

## 2021-07-14 NOTE — PCM.SN.[email or web address]
- Free Text/Narrative


Note: 


Mr. Bosch is expressed to me that he was feeling significantly better the day 

after admission.  He stated that his wife who was at bedside had left all of her

medications including asthma medications at the [facility] they had been staying 

at.  They stated that despite knowing that he was having significant issues rela

yaneli to his heatstroke and that his lab abnormalities were still highly abnormal 

they wanted to leave the hospital to retrieve her medications.  I talked to them

about how I was not comfortable with him leaving in his medical state.  I stated

that he needed further care and without further care he may have worsening 

symptoms, kidney injury, or other detrimental effects to his health.  They 

voiced understanding.  They stated that they wanted to leave against my advice, 

I did tell them that they would have to sign a form stating that they were 

leaving AGAINST MEDICAL ADVICE and they were okay with that.  I did advise them 

that if he became confused, was having difficulty speaking or slurring, or was 

becoming lethargic that they needed to call emergency services.  His wife stated

that they do not have a cell phone but she would flagged down a vehicle if while

driving the over an hour trip to the [facility] he became altered.  I stated 

concerned that they did not have a cell phone and they still wanted to leave 

AGAINST MEDICAL ADVICE.  I did advise them to use the air conditioner in the car

and to avoid being in hot situations and being in direct sunlight. They did end 

up leaving AGAINST MEDICAL ADVICE.